# Patient Record
(demographics unavailable — no encounter records)

---

## 2025-06-24 NOTE — DISCUSSION/SUMMARY
[EKG obtained to assist in diagnosis and management of assessed problem(s)] : EKG obtained to assist in diagnosis and management of assessed problem(s) [FreeTextEntry1] : TTE IV Lexiscan nuclear stress test Patient was instructed to target his T. Cholesterol to less than 200 mg/dl and LDL cholesterol to less than 100 mg/dl. His lipid levels have improved. Serum TG level is still mildly elevated and his HDL cholesterol is 37 mg/dl below the normal range. He is on rosuvastatin 10 mg QHS EKG: NSR, rate of 90 bpm, nonspecific ST T changes. EKG was interpreted and reviewed with the patient. Exercise and weight loss was advised. Maintain present antihypertensive therapy. He was counseled on a low fat, low cholesterol diet. Patient was advised on his BMP, CBC , fasting lipid profile and hepatic panel. RV in 2 weeks.

## 2025-06-24 NOTE — ASSESSMENT
[FreeTextEntry1] : TAA Hypertension Normal LV systolic function Diastolic dysfunction Mild AI Repeat CT scan for PE protocol was negative for PE Hyperlipidemia

## 2025-06-24 NOTE — HISTORY OF PRESENT ILLNESS
[FreeTextEntry1] : Hypertension Familial history of heart disease, patient's father had coronary stents placed in his 70's and developed renal failure and required dialysis. Obesity The patient denies a prior history of MI, angina, CHF arrhythmia, TIA, CVA, syncope, DM, or smoking. Dilated aortic root measuring 4.[address] scan. 4.5 cm on TTE Mild AI Diastolic dysfunction Hyperlipidemia